# Patient Record
Sex: FEMALE | Race: WHITE | NOT HISPANIC OR LATINO
[De-identification: names, ages, dates, MRNs, and addresses within clinical notes are randomized per-mention and may not be internally consistent; named-entity substitution may affect disease eponyms.]

---

## 2022-02-04 ENCOUNTER — APPOINTMENT (OUTPATIENT)
Dept: HEART AND VASCULAR | Facility: CLINIC | Age: 77
End: 2022-02-04

## 2022-02-04 ENCOUNTER — APPOINTMENT (OUTPATIENT)
Dept: HEART AND VASCULAR | Facility: CLINIC | Age: 77
End: 2022-02-04
Payer: COMMERCIAL

## 2022-02-04 VITALS
HEIGHT: 69 IN | DIASTOLIC BLOOD PRESSURE: 78 MMHG | HEART RATE: 78 BPM | TEMPERATURE: 97.6 F | SYSTOLIC BLOOD PRESSURE: 134 MMHG | BODY MASS INDEX: 24.88 KG/M2 | OXYGEN SATURATION: 98 % | RESPIRATION RATE: 15 BRPM | WEIGHT: 168 LBS

## 2022-02-04 DIAGNOSIS — Z78.9 OTHER SPECIFIED HEALTH STATUS: ICD-10-CM

## 2022-02-04 DIAGNOSIS — Z91.89 OTHER SPECIFIED PERSONAL RISK FACTORS, NOT ELSEWHERE CLASSIFIED: ICD-10-CM

## 2022-02-04 DIAGNOSIS — Z87.891 PERSONAL HISTORY OF NICOTINE DEPENDENCE: ICD-10-CM

## 2022-02-04 DIAGNOSIS — I45.10 UNSPECIFIED RIGHT BUNDLE-BRANCH BLOCK: ICD-10-CM

## 2022-02-04 DIAGNOSIS — R03.0 ELEVATED BLOOD-PRESSURE READING, W/OUT DIAGNOSIS OF HYPERTENSION: ICD-10-CM

## 2022-02-04 DIAGNOSIS — R94.31 ABNORMAL ELECTROCARDIOGRAM [ECG] [EKG]: ICD-10-CM

## 2022-02-04 PROBLEM — Z00.00 ENCOUNTER FOR PREVENTIVE HEALTH EXAMINATION: Status: ACTIVE | Noted: 2022-02-04

## 2022-02-04 PROCEDURE — 93015 CV STRESS TEST SUPVJ I&R: CPT

## 2022-02-04 PROCEDURE — 99203 OFFICE O/P NEW LOW 30 MIN: CPT | Mod: 25

## 2022-02-04 PROCEDURE — 93306 TTE W/DOPPLER COMPLETE: CPT

## 2022-02-07 PROBLEM — I45.10 INCOMPLETE RBBB: Status: ACTIVE | Noted: 2022-02-04

## 2022-02-07 NOTE — DISCUSSION/SUMMARY
[FreeTextEntry1] : structurally normal heart\par neg stress test\par BP ok today\par ambi telemetry ordered

## 2022-02-07 NOTE — HISTORY OF PRESENT ILLNESS
[FreeTextEntry1] : referred by Dr Gandhi for cardiac eval of new RBBB\par at routine visit\par her BP has been labile in last few weeks as well\par she denies any chest discomfort or dyspnea\par no palps or lightheadedness\par functional capacity stable\par no syncope

## 2022-02-24 ENCOUNTER — OUTPATIENT (OUTPATIENT)
Dept: OUTPATIENT SERVICES | Facility: HOSPITAL | Age: 77
LOS: 1 days | End: 2022-02-24
Payer: COMMERCIAL

## 2022-02-24 DIAGNOSIS — R93.89 ABNORMAL FINDINGS ON DIAGNOSTIC IMAGING OF OTHER SPECIFIED BODY STRUCTURES: ICD-10-CM

## 2022-02-24 PROCEDURE — 94760 N-INVAS EAR/PLS OXIMETRY 1: CPT

## 2022-02-24 PROCEDURE — 94726 PLETHYSMOGRAPHY LUNG VOLUMES: CPT | Mod: 26

## 2022-02-24 PROCEDURE — 94060 EVALUATION OF WHEEZING: CPT

## 2022-02-24 PROCEDURE — 94010 BREATHING CAPACITY TEST: CPT | Mod: 26

## 2022-02-24 PROCEDURE — 94726 PLETHYSMOGRAPHY LUNG VOLUMES: CPT

## 2022-02-24 PROCEDURE — 94729 DIFFUSING CAPACITY: CPT | Mod: 26

## 2022-02-24 PROCEDURE — 94729 DIFFUSING CAPACITY: CPT

## 2023-04-05 ENCOUNTER — EMERGENCY (EMERGENCY)
Facility: HOSPITAL | Age: 78
LOS: 1 days | Discharge: HOME | End: 2023-04-05
Attending: EMERGENCY MEDICINE | Admitting: STUDENT IN AN ORGANIZED HEALTH CARE EDUCATION/TRAINING PROGRAM
Payer: COMMERCIAL

## 2023-04-05 VITALS
HEART RATE: 122 BPM | RESPIRATION RATE: 16 BRPM | DIASTOLIC BLOOD PRESSURE: 97 MMHG | WEIGHT: 171.96 LBS | SYSTOLIC BLOOD PRESSURE: 152 MMHG | TEMPERATURE: 100 F | HEIGHT: 69 IN | OXYGEN SATURATION: 95 %

## 2023-04-05 DIAGNOSIS — I26.93 SINGLE SUBSEGMENTAL PULMONARY EMBOLISM WITHOUT ACUTE COR PULMONALE: ICD-10-CM

## 2023-04-05 DIAGNOSIS — I70.0 ATHEROSCLEROSIS OF AORTA: ICD-10-CM

## 2023-04-05 DIAGNOSIS — Z20.822 CONTACT WITH AND (SUSPECTED) EXPOSURE TO COVID-19: ICD-10-CM

## 2023-04-05 DIAGNOSIS — Z91.041 RADIOGRAPHIC DYE ALLERGY STATUS: ICD-10-CM

## 2023-04-05 DIAGNOSIS — J02.9 ACUTE PHARYNGITIS, UNSPECIFIED: ICD-10-CM

## 2023-04-05 DIAGNOSIS — R79.1 ABNORMAL COAGULATION PROFILE: ICD-10-CM

## 2023-04-05 DIAGNOSIS — Z86.718 PERSONAL HISTORY OF OTHER VENOUS THROMBOSIS AND EMBOLISM: ICD-10-CM

## 2023-04-05 DIAGNOSIS — N28.1 CYST OF KIDNEY, ACQUIRED: ICD-10-CM

## 2023-04-05 DIAGNOSIS — R00.0 TACHYCARDIA, UNSPECIFIED: ICD-10-CM

## 2023-04-05 DIAGNOSIS — Z28.39 OTHER UNDERIMMUNIZATION STATUS: ICD-10-CM

## 2023-04-05 DIAGNOSIS — I26.99 OTHER PULMONARY EMBOLISM WITHOUT ACUTE COR PULMONALE: ICD-10-CM

## 2023-04-05 LAB
ANION GAP SERPL CALC-SCNC: 20 MMOL/L — HIGH (ref 5–17)
APPEARANCE UR: CLEAR — SIGNIFICANT CHANGE UP
BACTERIA # UR AUTO: SIGNIFICANT CHANGE UP /HPF
BASE EXCESS BLDV CALC-SCNC: 3.2 MMOL/L — HIGH (ref -2–3)
BASOPHILS # BLD AUTO: 0.04 K/UL — SIGNIFICANT CHANGE UP (ref 0–0.2)
BASOPHILS NFR BLD AUTO: 0.5 % — SIGNIFICANT CHANGE UP (ref 0–2)
BILIRUB UR-MCNC: NEGATIVE — SIGNIFICANT CHANGE UP
BUN SERPL-MCNC: 14 MG/DL — SIGNIFICANT CHANGE UP (ref 7–23)
CA-I SERPL-SCNC: 1.17 MMOL/L — SIGNIFICANT CHANGE UP (ref 1.15–1.33)
CALCIUM SERPL-MCNC: 8.9 MG/DL — SIGNIFICANT CHANGE UP (ref 8.4–10.5)
CHLORIDE SERPL-SCNC: 94 MMOL/L — LOW (ref 96–108)
CO2 BLDV-SCNC: 31.5 MMOL/L — HIGH (ref 22–26)
CO2 SERPL-SCNC: 25 MMOL/L — SIGNIFICANT CHANGE UP (ref 22–31)
COLOR SPEC: YELLOW — SIGNIFICANT CHANGE UP
CREAT SERPL-MCNC: 0.78 MG/DL — SIGNIFICANT CHANGE UP (ref 0.5–1.3)
D DIMER BLD IA.RAPID-MCNC: 585 NG/ML DDU — HIGH
DIFF PNL FLD: ABNORMAL
EGFR: 79 ML/MIN/1.73M2 — SIGNIFICANT CHANGE UP
EOSINOPHIL # BLD AUTO: 0.06 K/UL — SIGNIFICANT CHANGE UP (ref 0–0.5)
EOSINOPHIL NFR BLD AUTO: 0.8 % — SIGNIFICANT CHANGE UP (ref 0–6)
EPI CELLS # UR: SIGNIFICANT CHANGE UP /HPF (ref 0–5)
FLUAV AG NPH QL: SIGNIFICANT CHANGE UP
FLUBV AG NPH QL: SIGNIFICANT CHANGE UP
GAS PNL BLDV: 132 MMOL/L — LOW (ref 136–145)
GAS PNL BLDV: SIGNIFICANT CHANGE UP
GAS PNL BLDV: SIGNIFICANT CHANGE UP
GLUCOSE SERPL-MCNC: 103 MG/DL — HIGH (ref 70–99)
GLUCOSE UR QL: NEGATIVE — SIGNIFICANT CHANGE UP
HCO3 BLDV-SCNC: 30 MMOL/L — HIGH (ref 22–29)
HCT VFR BLD CALC: 49.9 % — HIGH (ref 34.5–45)
HGB BLD-MCNC: 16.1 G/DL — HIGH (ref 11.5–15.5)
IMM GRANULOCYTES NFR BLD AUTO: 0.4 % — SIGNIFICANT CHANGE UP (ref 0–0.9)
KETONES UR-MCNC: 15 MG/DL
LACTATE SERPL-SCNC: 0.9 MMOL/L — SIGNIFICANT CHANGE UP (ref 0.5–2)
LEUKOCYTE ESTERASE UR-ACNC: NEGATIVE — SIGNIFICANT CHANGE UP
LYMPHOCYTES # BLD AUTO: 1.29 K/UL — SIGNIFICANT CHANGE UP (ref 1–3.3)
LYMPHOCYTES # BLD AUTO: 16.7 % — SIGNIFICANT CHANGE UP (ref 13–44)
MAGNESIUM SERPL-MCNC: 2.1 MG/DL — SIGNIFICANT CHANGE UP (ref 1.6–2.6)
MCHC RBC-ENTMCNC: 30.6 PG — SIGNIFICANT CHANGE UP (ref 27–34)
MCHC RBC-ENTMCNC: 32.3 GM/DL — SIGNIFICANT CHANGE UP (ref 32–36)
MCV RBC AUTO: 94.9 FL — SIGNIFICANT CHANGE UP (ref 80–100)
MONOCYTES # BLD AUTO: 0.86 K/UL — SIGNIFICANT CHANGE UP (ref 0–0.9)
MONOCYTES NFR BLD AUTO: 11.1 % — SIGNIFICANT CHANGE UP (ref 2–14)
NEUTROPHILS # BLD AUTO: 5.44 K/UL — SIGNIFICANT CHANGE UP (ref 1.8–7.4)
NEUTROPHILS NFR BLD AUTO: 70.5 % — SIGNIFICANT CHANGE UP (ref 43–77)
NITRITE UR-MCNC: NEGATIVE — SIGNIFICANT CHANGE UP
NRBC # BLD: 0 /100 WBCS — SIGNIFICANT CHANGE UP (ref 0–0)
NT-PROBNP SERPL-SCNC: 117 PG/ML — SIGNIFICANT CHANGE UP (ref 0–300)
PCO2 BLDV: 53 MMHG — HIGH (ref 39–42)
PH BLDV: 7.36 — SIGNIFICANT CHANGE UP (ref 7.32–7.43)
PH UR: 6.5 — SIGNIFICANT CHANGE UP (ref 5–8)
PLATELET # BLD AUTO: 258 K/UL — SIGNIFICANT CHANGE UP (ref 150–400)
PO2 BLDV: <33 MMHG — LOW (ref 25–45)
POTASSIUM BLDV-SCNC: 4.4 MMOL/L — SIGNIFICANT CHANGE UP (ref 3.5–5.1)
POTASSIUM SERPL-MCNC: SIGNIFICANT CHANGE UP (ref 3.5–5.3)
POTASSIUM SERPL-SCNC: SIGNIFICANT CHANGE UP (ref 3.5–5.3)
PROT UR-MCNC: NEGATIVE MG/DL — SIGNIFICANT CHANGE UP
RBC # BLD: 5.26 M/UL — HIGH (ref 3.8–5.2)
RBC # FLD: 12.6 % — SIGNIFICANT CHANGE UP (ref 10.3–14.5)
RBC CASTS # UR COMP ASSIST: ABNORMAL /HPF
RSV RNA NPH QL NAA+NON-PROBE: SIGNIFICANT CHANGE UP
SAO2 % BLDV: 21.3 % — LOW (ref 67–88)
SARS-COV-2 RNA SPEC QL NAA+PROBE: SIGNIFICANT CHANGE UP
SODIUM SERPL-SCNC: 139 MMOL/L — SIGNIFICANT CHANGE UP (ref 135–145)
SP GR SPEC: <=1.005 — SIGNIFICANT CHANGE UP (ref 1–1.03)
TROPONIN T SERPL-MCNC: 0.01 NG/ML — SIGNIFICANT CHANGE UP (ref 0–0.01)
UROBILINOGEN FLD QL: 0.2 E.U./DL — SIGNIFICANT CHANGE UP
WBC # BLD: 7.72 K/UL — SIGNIFICANT CHANGE UP (ref 3.8–10.5)
WBC # FLD AUTO: 7.72 K/UL — SIGNIFICANT CHANGE UP (ref 3.8–10.5)
WBC UR QL: < 5 /HPF — SIGNIFICANT CHANGE UP

## 2023-04-05 PROCEDURE — 99285 EMERGENCY DEPT VISIT HI MDM: CPT

## 2023-04-05 PROCEDURE — 71045 X-RAY EXAM CHEST 1 VIEW: CPT | Mod: 26

## 2023-04-05 RX ORDER — SODIUM CHLORIDE 9 MG/ML
250 INJECTION INTRAMUSCULAR; INTRAVENOUS; SUBCUTANEOUS ONCE
Refills: 0 | Status: COMPLETED | OUTPATIENT
Start: 2023-04-05 | End: 2023-04-05

## 2023-04-05 RX ORDER — SODIUM CHLORIDE 9 MG/ML
1000 INJECTION INTRAMUSCULAR; INTRAVENOUS; SUBCUTANEOUS ONCE
Refills: 0 | Status: COMPLETED | OUTPATIENT
Start: 2023-04-05 | End: 2023-04-05

## 2023-04-05 RX ORDER — ACETAMINOPHEN 500 MG
1000 TABLET ORAL ONCE
Refills: 0 | Status: COMPLETED | OUTPATIENT
Start: 2023-04-05 | End: 2023-04-05

## 2023-04-05 RX ORDER — DIPHENHYDRAMINE HCL 50 MG
50 CAPSULE ORAL ONCE
Refills: 0 | Status: COMPLETED | OUTPATIENT
Start: 2023-04-05 | End: 2023-04-05

## 2023-04-05 RX ADMIN — SODIUM CHLORIDE 1000 MILLILITER(S): 9 INJECTION INTRAMUSCULAR; INTRAVENOUS; SUBCUTANEOUS at 20:57

## 2023-04-05 RX ADMIN — SODIUM CHLORIDE 1000 MILLILITER(S): 9 INJECTION INTRAMUSCULAR; INTRAVENOUS; SUBCUTANEOUS at 21:41

## 2023-04-05 RX ADMIN — SODIUM CHLORIDE 250 MILLILITER(S): 9 INJECTION INTRAMUSCULAR; INTRAVENOUS; SUBCUTANEOUS at 22:00

## 2023-04-05 RX ADMIN — SODIUM CHLORIDE 500 MILLILITER(S): 9 INJECTION INTRAMUSCULAR; INTRAVENOUS; SUBCUTANEOUS at 21:38

## 2023-04-05 RX ADMIN — Medication 50 MILLIGRAM(S): at 22:59

## 2023-04-05 RX ADMIN — SODIUM CHLORIDE 1000 MILLILITER(S): 9 INJECTION INTRAMUSCULAR; INTRAVENOUS; SUBCUTANEOUS at 22:00

## 2023-04-05 RX ADMIN — Medication 1000 MILLIGRAM(S): at 21:38

## 2023-04-05 RX ADMIN — Medication 40 MILLIGRAM(S): at 19:57

## 2023-04-05 NOTE — ED PROVIDER NOTE - OBJECTIVE STATEMENT
Pt w/ no sig PMHx, no PSHx p/w tachycardia. She is a . She took 2 flights from [place] 4 days ago. 2 days ago she felt burning chest discomfort which she has felt is bronchitis for her, mild dry cough, sore throat, and hoarseness. Sh ewent to PCP Dr Gandhi today and she was tachycardic, referred into the hospital for r/o PE. She denies SOB, f/c, n/v/d, dysuria. She is vaccinated against COVID19, not vaccinated against the flu. Denies current CP, SOB, palpitations.

## 2023-04-05 NOTE — ED PROVIDER NOTE - PROGRESS NOTE DETAILS
pt resting comfortably. no PE on CT.   I have discussed the discharge plan with the patient. The patient agrees with the plan, as discussed.  The patient understands Emergency Department diagnosis is a preliminary diagnosis often based on limited information and that the patient must adhere to the follow-up plan as discussed.  The patient understands that if the symptoms worsen  the patient may return to the Emergency Department at any time for further evaluation and treatment. Radiology called, small subsegmental PE noted. Discussed with pt.  Appears comfortable, no SOB or CP.  No tachycardia, tachypnea, or hypoxia.  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  No hx of GI bleed or other bleeding problems.  Will Rx Eliquis.  She will follow up with Dr Gandhi tomorrow.

## 2023-04-05 NOTE — ED ADULT TRIAGE NOTE - CHIEF COMPLAINT QUOTE
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 reports having a rapid heart rate in the 120s in the MD office today. sent in to R/O a PE. denies CP, SOB, leg swelling.

## 2023-04-05 NOTE — ED PROVIDER NOTE - NSFOLLOWUPINSTRUCTIONS_ED_ALL_ED_FT
Follow-up with your primary care physician    Fever, Adult    A fever is an increase in the body's temperature. It is usually defined as a temperature of 100.4°F (38°C) or higher. Brief mild or moderate fevers generally have no long-term effects, and they often do not need treatment. Moderate or high fevers may make you feel uncomfortable and can sometimes be a sign of a serious illness or disease. The sweating that may occur with repeated or prolonged fever may also cause a loss of fluid in the body (dehydration).    Fever is confirmed by taking a temperature with a thermometer. A measured temperature can vary with:  Age.  Time of day.  Where in the body you take the temperature. Readings may vary if you place the thermometer:  In the mouth (oral).  In the rectum (rectal).  In the ear (tympanic).  Under the arm (axillary).  On the forehead (temporal).    Follow these instructions at home:    Medicines    Take over-the counter and prescription medicines only as told by your health care provider. Follow the dosing instructions carefully.  If you were prescribed an antibiotic medicine, take it as told by your health care provider. Do not stop taking the antibiotic even if you start to feel better.    General instructions    Watch your condition for any changes. Let your health care provider know about them.  Rest as needed.  Drink enough fluid to keep your urine pale yellow. This helps to prevent dehydration.  Sponge yourself or bathe with room-temperature water to help reduce your body temperature as needed. Do not use ice water.  Do not use too many blankets or wear clothes that are too heavy.  If your fever may be caused by an infection that spreads from person to person (is contagious), such as a cold or the flu, you should stay home from work and public gatherings for at least 24 hours after your fever is gone. Your fever should be gone without the need to use medicines.  Contact a health care provider if:  You vomit.  You cannot eat or drink without vomiting.  You have diarrhea.  You have pain when you urinate.  Your symptoms do not improve with treatment.  You develop new symptoms.  You develop excessive weakness.    Get help right away if:  You have shortness of breath or have trouble breathing.  You are dizzy or you faint.  You are disoriented or confused.  You develop signs of dehydration, such as:  Dark urine, very little urine, or no urine.  Cracked lips.  Dry mouth.  Sunken eyes.  Sleepiness.  Weakness.  You develop severe pain in your abdomen.  You have persistent vomiting or diarrhea.  You develop a skin rash.  Your symptoms suddenly get worse.    Summary  A fever is an increase in the body's temperature. It is usually defined as a temperature of 100.4°F (38°C) or higher. Moderate or high fevers can sometimes be a sign of a serious illness or disease. The sweating that may occur with repeated or prolonged fever may also cause dehydration.  Pay attention to any changes in your symptoms and contact your health care provider if your symptoms do not improve with treatment.  Take over-the counter and prescription medicines only as told by your health care provider. Follow the dosing instructions carefully.  If your fever is from an infection that may be contagious, such as cold or flu, you should stay home from work and public gatherings for at least 24 hours after your fever is gone. Your fever should be gone without the need to use medicines.  Get help right away if you develop signs of dehydration, such as dark urine, cracked lips, dry mouth, sunken eyes, sleepiness, or weakness.  This information is not intended to replace advice given to you by your health care provider. Make sure you discuss any questions you have with your health care provider. Follow-up with your primary care physician  Pulmonary Embolism  Blood clot moving from a blood vessel in the leg to the lungs, with close-ups showing the blood clot in the lung.  A pulmonary embolism (PE) is a sudden blockage or decrease of blood flow in one or both lungs that happens when a clot travels into the arteries of the lung (pulmonary arteries). Most blockages come from a blood clot that forms in the vein of a leg or arm (deep vein thrombosis, DVT) and travels to the lungs. A clot is blood that has thickened into a gel or solid. PE is a dangerous and life-threatening condition that needs to be treated right away.    What are the causes?  This condition is usually caused by a blood clot that forms in a vein and moves to the lungs. In rare cases, it may be caused by air, fat, part of a tumor, or other tissue that moves through the veins and into the lungs.    What increases the risk?  The following factors may make you more likely to develop this condition:  Experiencing a traumatic injury, such as breaking a hip or leg.  Having:  A spinal cord injury.  Major surgery, especially hip or knee replacement, or surgery on parts of the nervous system or on the abdomen.  A stroke.  A blood-clotting disease.  Long-term (chronic) lung or heart disease.  Cancer, especially if you are being treated with chemotherapy.  A central venous catheter.  Taking medicines that contain estrogen. These include birth control pills and hormone replacement therapy.  Being:  Pregnant.  In the period of time after your baby is delivered (postpartum).  Older than age 60.  Overweight.  A smoker, especially if you have other risks.  Not very active (sedentary), not being able to move at all, or spending long periods sitting, such as travel over 6 hours. You are also at a greater risk if you have a leg in a cast or splint.  What are the signs or symptoms?  Symptoms of this condition usually start suddenly and include:  Shortness of breath during activity or at rest.  Coughing, coughing up blood, or coughing up bloody mucus.  Chest pain, back pain, or shoulder blade pain that gets worse with deep breaths.  Rapid or irregular heartbeat.  Feeling light-headed or dizzy, or fainting.  Feeling anxious.  Pain and swelling in a leg. This is a symptom of DVT, which can lead to PE.  How is this diagnosed?  This condition may be diagnosed based on your medical history, a physical exam, and tests. Tests may include:  Blood tests.  An ECG (electrocardiogram) of the heart.  A CT pulmonary angiogram. This test checks blood flow in and around your lungs.  A ventilation–perfusion scan, also called a lung VQ scan. This test measures air flow and blood flow to the lungs.  An ultrasound to check for a DVT.  How is this treated?  Treatment for this condition depends on many factors, such as the cause of your PE, your risk for bleeding or developing more clots, and other medical conditions you may have. Treatment aims to stop blood clots from forming or growing larger. In some cases, treatment may be aimed at breaking apart or removing the blood clot. Treatment may include:  Medicines, such as:  Blood thinning medicines, also called anticoagulants, to stop clots from forming and growing.  Medicines that break apart clots (fibrinolytics).  Procedures, such as:  Using a flexible tube to remove a blood clot (embolectomy) or to deliver medicine to destroy it (catheter-directed thrombolysis).  Surgery to remove the clot (surgical embolectomy). This is rare.  You may need a combination of immediate, long-term, and extended treatments. Your treatment may continue for several months (maintenance therapy) or longer depending on your medical conditions. You and your health care provider will work together to choose the treatment program that is best for you.    Follow these instructions at home:  Medicines    Take over-the-counter and prescription medicines only as told by your health care provider.  If you are taking blood thinners:  Talk with your health care provider before you take any medicines that contain aspirin or NSAIDs, such as ibuprofen. These medicines increase your risk for dangerous bleeding.  Take your medicine exactly as told, at the same time every day.  Avoid activities that could cause injury or bruising, and follow instructions about how to prevent falls.  Wear a medical alert bracelet or carry a card that lists what medicines you take.  Understand what foods and drugs interact with any medicines that you are taking.  General instructions    Ask your health care provider when you may return to your normal activities. Avoid sitting or lying for a long time without moving.  Maintain a healthy weight. Ask your health care provider what weight is healthy for you.  Do not use any products that contain nicotine or tobacco. These products include cigarettes, chewing tobacco, and vaping devices, such as e-cigarettes. If you need help quitting, ask your health care provider.  Talk with your health care provider about any travel plans. It is important to make sure that you are still able to take your medicine while traveling.  Keep all follow-up visits. This is important.  Where to find more information  American Lung Association: www.lung.org  Centers for Disease Control and Prevention: www.cdc.gov  Contact a health care provider if:  You missed a dose of your blood thinner medicine.  You have a fever.  Get help right away if:  You have:  New or increased pain, swelling, warmth, or redness in an arm or leg.  Shortness of breath that gets worse during activity or at rest.  Worsening chest pain.  A rapid or irregular heartbeat.  A severe headache.  Vision changes.  A serious fall or accident, or you hit your head.  Blood in your vomit, stool, or urine.  A cut that will not stop bleeding.  You cough up blood.  You feel light-headed or dizzy, and that feeling does not go away.  You cannot move your arms or legs.  You are confused or have memory loss.  These symptoms may represent a serious problem that is an emergency. Do not wait to see if the symptoms will go away. Get medical help right away. Call your local emergency services (771 in the [place] Do not drive yourself to the hospital.    Summary  A pulmonary embolism (PE) is a serious and potentially life-threatening condition. It happens when a blood clot from one part of the body travels to the arteries of the lung, causing a sudden blockage or decrease of blood flow to the lungs. This may result in shortness of breath, chest pain, dizziness, and fainting.  Treatments for this condition usually include medicines to thin your blood (anticoagulants) or medicines to break apart blood clots.  If you are given blood thinners, take your medicine exactly as told by your health care provider, at the same time every day. This is important.  Understand what foods and drugs interact with any medicines that you are taking.  If you have signs of PE or DVT, call your local emergency services (161 in the [place]  This information is not intended to replace advice given to you by your health care provider. Make sure you discuss any questions you have with your health care provider.  ===================  Fever, Adult    A fever is an increase in the body's temperature. It is usually defined as a temperature of 100.4°F (38°C) or higher. Brief mild or moderate fevers generally have no long-term effects, and they often do not need treatment. Moderate or high fevers may make you feel uncomfortable and can sometimes be a sign of a serious illness or disease. The sweating that may occur with repeated or prolonged fever may also cause a loss of fluid in the body (dehydration).    Fever is confirmed by taking a temperature with a thermometer. A measured temperature can vary with:  Age.  Time of day.  Where in the body you take the temperature. Readings may vary if you place the thermometer:  In the mouth (oral).  In the rectum (rectal).  In the ear (tympanic).  Under the arm (axillary).  On the forehead (temporal).    Follow these instructions at home:    Medicines    Take over-the counter and prescription medicines only as told by your health care provider. Follow the dosing instructions carefully.  If you were prescribed an antibiotic medicine, take it as told by your health care provider. Do not stop taking the antibiotic even if you start to feel better.    General instructions    Watch your condition for any changes. Let your health care provider know about them.  Rest as needed.  Drink enough fluid to keep your urine pale yellow. This helps to prevent dehydration.  Sponge yourself or bathe with room-temperature water to help reduce your body temperature as needed. Do not use ice water.  Do not use too many blankets or wear clothes that are too heavy.  If your fever may be caused by an infection that spreads from person to person (is contagious), such as a cold or the flu, you should stay home from work and public gatherings for at least 24 hours after your fever is gone. Your fever should be gone without the need to use medicines.  Contact a health care provider if:  You vomit.  You cannot eat or drink without vomiting.  You have diarrhea.  You have pain when you urinate.  Your symptoms do not improve with treatment.  You develop new symptoms.  You develop excessive weakness.    Get help right away if:  You have shortness of breath or have trouble breathing.  You are dizzy or you faint.  You are disoriented or confused.  You develop signs of dehydration, such as:  Dark urine, very little urine, or no urine.  Cracked lips.  Dry mouth.  Sunken eyes.  Sleepiness.  Weakness.  You develop severe pain in your abdomen.  You have persistent vomiting or diarrhea.  You develop a skin rash.  Your symptoms suddenly get worse.    Summary  A fever is an increase in the body's temperature. It is usually defined as a temperature of 100.4°F (38°C) or higher. Moderate or high fevers can sometimes be a sign of a serious illness or disease. The sweating that may occur with repeated or prolonged fever may also cause dehydration.  Pay attention to any changes in your symptoms and contact your health care provider if your symptoms do not improve with treatment.  Take over-the counter and prescription medicines only as told by your health care provider. Follow the dosing instructions carefully.  If your fever is from an infection that may be contagious, such as cold or flu, you should stay home from work and public gatherings for at least 24 hours after your fever is gone. Your fever should be gone without the need to use medicines.  Get help right away if you develop signs of dehydration, such as dark urine, cracked lips, dry mouth, sunken eyes, sleepiness, or weakness.  This information is not intended to replace advice given to you by your health care provider. Make sure you discuss any questions you have with your health care provider.

## 2023-04-05 NOTE — ED PROVIDER NOTE - CLINICAL SUMMARY MEDICAL DECISION MAKING FREE TEXT BOX
Pt referred to the ED for r/o PE for tachycardia, recent travel. However, pt a/w URI sx, warm to touch. Febrile to 101+ in the ED. No hypoxia or clinical signs / sx DVT. DDx includes but not limited to sepsis 2/2 viral infection (less likely bacterial), dehydration, PE, less likely other pathology. D-dimer +, contrast allergy. Premedicate for CT. IVF, tylenol. Pancx. Dispo pending w/u and clinical status

## 2023-04-05 NOTE — ED PROVIDER NOTE - PHYSICAL EXAMINATION
Constitutional: Well appearing, awake, alert, oriented to person, place, time/situation and in no apparent distress. non toxic appearing  ENMT: Airway patent. Normal MM  Eyes: Clear bilaterally  Cardiac: Tachycardic rate, regular rhythm.  Heart sounds S1, S2.  No murmurs, rubs or gallops. No JVD or LE edema  Respiratory: Breaths sounds equal and clear b/l. No W/R/R. No increased WOB, tachypnea, hypoxia, or accessory mm use. Pt speaks in full sentences.   Gastrointestinal: Abd soft, NT, ND, NABS. No guarding, rebound, or rigidity. No pulsatile abdominal masses.  Musculoskeletal: Range of motion is not limited. no calf ttp  Neuro: Alert and oriented x 3, face symmetric and speech fluent. Strength 5/5 x 4 ext and symmetric, nml gross motor movement, nml gait. No focal deficits noted.  Skin: Skin normal color for race, warm, dry and intact. No evidence of rash.  Psych: Alert and oriented to person, place, time/situation. normal mood and affect. no apparent risk to self or others.

## 2023-04-05 NOTE — ED ADULT NURSE NOTE - OBJECTIVE STATEMENT
Patient a+o x4 c/o increased heart rate at MD office today, also states "I have bronchitis right now". Speaking in full sentences without difficulty, denies sob/cp/dizziness/n/v/fever/chills. IV initiated, labs collected and sent; tolerated well. Awaiting results.

## 2023-04-05 NOTE — ED ADULT NURSE NOTE - ISAR SCORE
NG tube was placed and patient did not tolerating the tube well. When nurse returned to patients room with benzocaine spray patient suddenly became extremely confused and did not remember how she got to the hospital, why she is here. MD notified.     0

## 2023-04-05 NOTE — ED PROVIDER NOTE - PATIENT PORTAL LINK FT
You can access the FollowMyHealth Patient Portal offered by [facility] by registering at the following website: [email or web address]. By joining [employer]’s FollowMyHealth portal, you will also be able to view your health information using other applications (apps) compatible with our system.

## 2023-04-05 NOTE — ED PROVIDER NOTE - NS ED ROS FT
Constitutional: No fever or chills.   Eyes: No pain, blurry vision, or discharge.  ENMT: No hearing changes, pain, discharge or infections. No neck pain or stiffness.  Cardiac: See HPI  Respiratory: SeeHPI  GI: No nausea, vomiting, diarrhea or abdominal pain.  : No dysuria, frequency or burning.  MS: No myalgia, muscle weakness, joint pain or back pain.  Neuro: No headache or weakness. No LOC.  Skin: No skin rash.   Endocrine: No history of thyroid disease or diabetes.  Except as documented in the HPI, all other systems are negative.

## 2023-04-05 NOTE — ED ADULT NURSE NOTE - NSIMPLEMENTINTERV_GEN_ALL_ED
Implemented All Universal Safety Interventions:  [place] to call system. Call bell, personal items and telephone within reach. Instruct patient to call for assistance. Room bathroom lighting operational. Non-slip footwear when patient is off stretcher. Physically safe environment: no spills, clutter or unnecessary equipment. Stretcher in lowest position, wheels locked, appropriate side rails in place.

## 2023-04-05 NOTE — ED PROVIDER NOTE - CARE PROVIDER_API CALL
Priscilla Gandhi  INTERNAL MEDICINE  [address] [address]  Phone: [phone]  Fax: [phone]  Follow Up Time: Urgent

## 2023-04-06 VITALS
TEMPERATURE: 98 F | OXYGEN SATURATION: 99 % | RESPIRATION RATE: 16 BRPM | HEART RATE: 73 BPM | DIASTOLIC BLOOD PRESSURE: 83 MMHG | SYSTOLIC BLOOD PRESSURE: 127 MMHG

## 2023-04-06 PROCEDURE — 82330 ASSAY OF CALCIUM: CPT

## 2023-04-06 PROCEDURE — 87637 SARSCOV2&INF A&B&RSV AMP PRB: CPT

## 2023-04-06 PROCEDURE — 36415 COLL VENOUS BLD VENIPUNCTURE: CPT

## 2023-04-06 PROCEDURE — 85379 FIBRIN DEGRADATION QUANT: CPT

## 2023-04-06 PROCEDURE — 96374 THER/PROPH/DIAG INJ IV PUSH: CPT | Mod: XU

## 2023-04-06 PROCEDURE — 84484 ASSAY OF TROPONIN QUANT: CPT

## 2023-04-06 PROCEDURE — 83735 ASSAY OF MAGNESIUM: CPT

## 2023-04-06 PROCEDURE — 84295 ASSAY OF SERUM SODIUM: CPT

## 2023-04-06 PROCEDURE — 80048 BASIC METABOLIC PNL TOTAL CA: CPT

## 2023-04-06 PROCEDURE — 93005 ELECTROCARDIOGRAM TRACING: CPT

## 2023-04-06 PROCEDURE — 71275 CT ANGIOGRAPHY CHEST: CPT | Mod: MA

## 2023-04-06 PROCEDURE — 71045 X-RAY EXAM CHEST 1 VIEW: CPT

## 2023-04-06 PROCEDURE — 83605 ASSAY OF LACTIC ACID: CPT

## 2023-04-06 PROCEDURE — 87086 URINE CULTURE/COLONY COUNT: CPT

## 2023-04-06 PROCEDURE — 96361 HYDRATE IV INFUSION ADD-ON: CPT

## 2023-04-06 PROCEDURE — 87040 BLOOD CULTURE FOR BACTERIA: CPT

## 2023-04-06 PROCEDURE — 82803 BLOOD GASES ANY COMBINATION: CPT

## 2023-04-06 PROCEDURE — 84132 ASSAY OF SERUM POTASSIUM: CPT

## 2023-04-06 PROCEDURE — 96375 TX/PRO/DX INJ NEW DRUG ADDON: CPT

## 2023-04-06 PROCEDURE — 85025 COMPLETE CBC W/AUTO DIFF WBC: CPT

## 2023-04-06 PROCEDURE — 83880 ASSAY OF NATRIURETIC PEPTIDE: CPT

## 2023-04-06 PROCEDURE — 99285 EMERGENCY DEPT VISIT HI MDM: CPT | Mod: 25

## 2023-04-06 PROCEDURE — 81001 URINALYSIS AUTO W/SCOPE: CPT

## 2023-04-06 PROCEDURE — 71275 CT ANGIOGRAPHY CHEST: CPT | Mod: 26,MA

## 2023-04-06 RX ORDER — APIXABAN 2.5 MG/1
1 TABLET, FILM COATED ORAL
Qty: 1 | Refills: 0
Start: 2023-04-06

## 2023-04-06 RX ORDER — BENZOCAINE AND MENTHOL 5; 1 G/100ML; G/100ML
1 LIQUID ORAL ONCE
Refills: 0 | Status: COMPLETED | OUTPATIENT
Start: 2023-04-06 | End: 2023-04-06

## 2023-04-06 RX ORDER — APIXABAN 2.5 MG/1
10 TABLET, FILM COATED ORAL ONCE
Refills: 0 | Status: COMPLETED | OUTPATIENT
Start: 2023-04-06 | End: 2023-04-06

## 2023-04-06 RX ADMIN — BENZOCAINE AND MENTHOL 1 LOZENGE: 5; 1 LIQUID ORAL at 04:55

## 2023-04-06 RX ADMIN — SODIUM CHLORIDE 1000 MILLILITER(S): 9 INJECTION INTRAMUSCULAR; INTRAVENOUS; SUBCUTANEOUS at 01:22

## 2023-04-06 RX ADMIN — APIXABAN 10 MILLIGRAM(S): 2.5 TABLET, FILM COATED ORAL at 04:37

## 2023-04-06 NOTE — ED ADULT NURSE REASSESSMENT NOTE - NS ED NURSE REASSESS COMMENT FT1
Received report from RHEA Puentes. Received patient in stretcher. AOX4. Vital signs as noted in flowsheet.  Patient denies chest pain, pain, discomfort, shortness of breath, difficulty breathing and any form of distress not noted. Patient oriented to ED area. Plan of care discussed and verbalized understanding. All needs attended. Purposeful proactive hourly rounding in progress.     CT done, No tongue swelling, drooling, change of voice noted. Hives not appreciated at this time. Patient speaking in full complete sentences.

## 2023-04-07 LAB
CULTURE RESULTS: NO GROWTH — SIGNIFICANT CHANGE UP
SPECIMEN SOURCE: SIGNIFICANT CHANGE UP

## 2023-04-10 LAB
CULTURE RESULTS: SIGNIFICANT CHANGE UP
CULTURE RESULTS: SIGNIFICANT CHANGE UP
SPECIMEN SOURCE: SIGNIFICANT CHANGE UP
SPECIMEN SOURCE: SIGNIFICANT CHANGE UP

## 2023-04-21 ENCOUNTER — TRANSCRIPTION ENCOUNTER (OUTPATIENT)
Age: 78
End: 2023-04-21

## 2023-12-05 ENCOUNTER — APPOINTMENT (OUTPATIENT)
Dept: MAMMOGRAPHY | Facility: HOSPITAL | Age: 78
End: 2023-12-05
Payer: COMMERCIAL

## 2023-12-05 ENCOUNTER — OUTPATIENT (OUTPATIENT)
Dept: OUTPATIENT SERVICES | Facility: HOSPITAL | Age: 78
LOS: 1 days | End: 2023-12-05
Payer: COMMERCIAL

## 2023-12-05 PROCEDURE — 77067 SCR MAMMO BI INCL CAD: CPT

## 2023-12-05 PROCEDURE — 77063 BREAST TOMOSYNTHESIS BI: CPT

## 2023-12-05 PROCEDURE — 77063 BREAST TOMOSYNTHESIS BI: CPT | Mod: 26

## 2023-12-05 PROCEDURE — 77067 SCR MAMMO BI INCL CAD: CPT | Mod: 26

## 2023-12-18 ENCOUNTER — APPOINTMENT (OUTPATIENT)
Dept: ORTHOPEDIC SURGERY | Facility: CLINIC | Age: 78
End: 2023-12-18
Payer: COMMERCIAL

## 2023-12-18 VITALS — BODY MASS INDEX: 25.48 KG/M2 | HEIGHT: 69 IN | WEIGHT: 172 LBS

## 2023-12-18 DIAGNOSIS — M17.0 BILATERAL PRIMARY OSTEOARTHRITIS OF KNEE: ICD-10-CM

## 2023-12-18 PROCEDURE — 99203 OFFICE O/P NEW LOW 30 MIN: CPT

## 2023-12-18 PROCEDURE — 73562 X-RAY EXAM OF KNEE 3: CPT | Mod: 50

## 2023-12-18 NOTE — HISTORY OF PRESENT ILLNESS
[de-identified] : First-time visit for this 78-year-old female who continues to be employed as a  for [employer].  She is here because of bilateral knee stiffness minimal to mild pain but also her main concern is the fact that her right knee especially is becoming bowed and knock kneed.  Patient is here to have her knees evaluated as stated before she is not in any significant continuous pain.

## 2023-12-18 NOTE — DISCUSSION/SUMMARY
[de-identified] : Patient I talked at length about the underlying etiology of her bilateral mild knee discomfort and also her observation of the both knees left greater than are becoming knock-knee.  We were able to review her radiographs today directly she was able to appreciate how there is significantly less space between the bones on the lateral compartment of each knee than the right.  We talked how the only way to correct this deformity would be a knee replacement surgery but her symptoms are minimal at this point.  In order to help allay some of her symptoms patient was told she can take her previously prescribed by another physician Voltaren she can also ice the knees when she returns from a 3-month vacation she will return to the office at that point we will further discuss possible HA injections for both knees.  This consultation lasted 30 minutes.

## 2023-12-18 NOTE — PHYSICAL EXAM
[de-identified] : Right knee has some mild valgus inclination on exam and full extension today knee stable extra stress valgus stress in both full extension and 90 degrees flexion range of motion 0 to 125 degrees.  Minimal lateral joint line tenderness  Similar findings in the  left knee today.  Mild valgus inclination.  Some mild tenderness palpation of the lateral joint line.  Range of motion 0 to 125 degrees knee stable extra stress valgus stress both full extension and 90 degrees of flexion. [de-identified] : Knee radiographs were ordered today.  AP standing individual lateral sunrise views were obtained showing moderate to advanced lateral compartment osteoarthritis of both knees more radiographically advanced on the left.  Both knees are showing some mild valgus inclination.  Early secondary findings in the lateral compartment of both osteophyte and cyst formation are noted.  The patellofemoral articulation is well-maintained as is the medial compartment of both knees.

## 2023-12-18 NOTE — REASON FOR VISIT
[Initial Visit] : an initial visit for [Knee Pain] : knee pain [FreeTextEntry2] : bilateral knee weakness.